# Patient Record
Sex: FEMALE | Race: WHITE | URBAN - METROPOLITAN AREA
[De-identification: names, ages, dates, MRNs, and addresses within clinical notes are randomized per-mention and may not be internally consistent; named-entity substitution may affect disease eponyms.]

---

## 2022-02-04 ENCOUNTER — OFFICE VISIT (OUTPATIENT)
Dept: URBAN - METROPOLITAN AREA CLINIC 23 | Facility: CLINIC | Age: 47
End: 2022-02-04

## 2022-02-24 ENCOUNTER — WEB ENCOUNTER (OUTPATIENT)
Dept: URBAN - METROPOLITAN AREA CLINIC 23 | Facility: CLINIC | Age: 47
End: 2022-02-24

## 2022-02-24 ENCOUNTER — OFFICE VISIT (OUTPATIENT)
Dept: URBAN - METROPOLITAN AREA CLINIC 23 | Facility: CLINIC | Age: 47
End: 2022-02-24
Payer: COMMERCIAL

## 2022-02-24 ENCOUNTER — LAB OUTSIDE AN ENCOUNTER (OUTPATIENT)
Dept: URBAN - METROPOLITAN AREA CLINIC 23 | Facility: CLINIC | Age: 47
End: 2022-02-24

## 2022-02-24 DIAGNOSIS — Z86.010 PERSONAL HISTORY OF COLONIC POLYPS: ICD-10-CM

## 2022-02-24 DIAGNOSIS — K62.5 RECTAL BLEED: ICD-10-CM

## 2022-02-24 DIAGNOSIS — K59.09 CHRONIC CONSTIPATION: ICD-10-CM

## 2022-02-24 DIAGNOSIS — Z80.0 FHX: COLON CANCER: ICD-10-CM

## 2022-02-24 DIAGNOSIS — R14.0 ABDOMINAL BLOATING: ICD-10-CM

## 2022-02-24 PROCEDURE — 99204 OFFICE O/P NEW MOD 45 MIN: CPT | Performed by: INTERNAL MEDICINE

## 2022-02-24 NOTE — HPI-TODAY'S VISIT:
c/o chronic constipation, bloating for about 20 years  takes stool softner and  dulcolax w relief taking for over 20 years fiberous food, fiber suppl, greasy spicy food make bloating worse bm q few days after taking laxative has occasional heart burn only after eating spicy food fhx colon ca bright red rectal bleeding for  6mo prior colonoscopy 10 years ago "maybe had crohn's diz" and polyps denies abd pain or  weight loss no prior anesthesia difficulties etoh socially, no smoking no lung or heart disease no blood thinners

## 2022-02-26 LAB
ENDOMYSIAL ANTIBODY IGA: NEGATIVE
IMMUNOGLOBULIN A, QN, SERUM: 155
T-TRANSGLUTAMINASE (TTG) IGA: <2

## 2022-03-04 ENCOUNTER — TELEPHONE ENCOUNTER (OUTPATIENT)
Dept: URBAN - METROPOLITAN AREA CLINIC 92 | Facility: CLINIC | Age: 47
End: 2022-03-04

## 2022-03-10 ENCOUNTER — OFFICE VISIT (OUTPATIENT)
Dept: URBAN - METROPOLITAN AREA CLINIC 23 | Facility: CLINIC | Age: 47
End: 2022-03-10

## 2022-03-10 PROBLEM — 116289008: Status: ACTIVE | Noted: 2022-02-24

## 2022-03-10 PROBLEM — 429006005 FAMILY HISTORY OF MALIGNANT NEOPLASM OF GASTROINTESTINAL TRACT: Status: ACTIVE | Noted: 2022-02-24

## 2022-03-10 PROBLEM — 12063002: Status: ACTIVE | Noted: 2022-02-24

## 2022-03-10 PROBLEM — 236069009: Status: ACTIVE | Noted: 2022-02-24

## 2022-04-25 ENCOUNTER — OFFICE VISIT (OUTPATIENT)
Dept: URBAN - METROPOLITAN AREA SURGERY CENTER 15 | Facility: SURGERY CENTER | Age: 47
End: 2022-04-25

## 2022-06-24 PROBLEM — 428283002 HISTORY OF POLYP OF COLON (SITUATION): Status: ACTIVE | Noted: 2022-02-24

## 2022-07-11 ENCOUNTER — OFFICE VISIT (OUTPATIENT)
Dept: URBAN - METROPOLITAN AREA SURGERY CENTER 15 | Facility: SURGERY CENTER | Age: 47
End: 2022-07-11

## 2023-02-20 ENCOUNTER — TELEPHONE ENCOUNTER (OUTPATIENT)
Dept: URBAN - METROPOLITAN AREA CLINIC 35 | Facility: CLINIC | Age: 48
End: 2023-02-20

## 2023-03-02 ENCOUNTER — TELEPHONE ENCOUNTER (OUTPATIENT)
Dept: URBAN - METROPOLITAN AREA CLINIC 78 | Facility: CLINIC | Age: 48
End: 2023-03-02

## 2023-09-05 ENCOUNTER — OFFICE VISIT (OUTPATIENT)
Dept: URBAN - METROPOLITAN AREA CLINIC 111 | Facility: CLINIC | Age: 48
End: 2023-09-05

## 2024-04-04 ENCOUNTER — LAB (OUTPATIENT)
Dept: URBAN - METROPOLITAN AREA CLINIC 23 | Facility: CLINIC | Age: 49
End: 2024-04-04

## 2024-04-04 ENCOUNTER — OV EP (OUTPATIENT)
Dept: URBAN - METROPOLITAN AREA CLINIC 23 | Facility: CLINIC | Age: 49
End: 2024-04-04
Payer: COMMERCIAL

## 2024-04-04 VITALS
SYSTOLIC BLOOD PRESSURE: 165 MMHG | BODY MASS INDEX: 20.04 KG/M2 | HEIGHT: 70 IN | TEMPERATURE: 97.9 F | HEART RATE: 54 BPM | DIASTOLIC BLOOD PRESSURE: 93 MMHG | WEIGHT: 140 LBS

## 2024-04-04 DIAGNOSIS — K59.09 CHRONIC CONSTIPATION: ICD-10-CM

## 2024-04-04 DIAGNOSIS — R14.0 ABDOMINAL BLOATING: ICD-10-CM

## 2024-04-04 DIAGNOSIS — R19.5 CHANGE IN STOOL CALIBER: ICD-10-CM

## 2024-04-04 DIAGNOSIS — Z80.0 FHX: COLON CANCER: ICD-10-CM

## 2024-04-04 DIAGNOSIS — R63.4 WEIGHT LOSS: ICD-10-CM

## 2024-04-04 DIAGNOSIS — Z86.010 PERSONAL HISTORY OF COLONIC POLYPS: ICD-10-CM

## 2024-04-04 PROBLEM — 89362005: Status: ACTIVE | Noted: 2024-04-04

## 2024-04-04 PROCEDURE — 99214 OFFICE O/P EST MOD 30 MIN: CPT | Performed by: INTERNAL MEDICINE

## 2024-04-04 NOTE — HPI-TODAY'S VISIT:
The patient presents with a chief complaint of persistent abdominal gurgling, bloating, and constipation that has worsened over the past one to two months. The patient reports that the gurgling and bloating occur with food intake and are particularly exacerbated by carbohydrates, bread, and pasta. The patient has been managing these symptoms by limiting their diet, resulting in a weight loss of 10-12 pounds over the past three to four months. The patient has a history of constipation and has been using over-the-counter laxatives, stool softeners, and fiber supplements to aid in bowel movements. Despite these interventions, the patient still experiences difficulty with bowel movements and requires the use of laxatives to have a bowel movement. The patient reports having thin stools on and off for an extended period. The patient has a family history of colon cancer, with their mother being diagnosed at the age of 49 and passing away at 54. The patient underwent a colonoscopy approximately eight years ago. The patient denies any blood in the stool, pain, or other symptoms. The patient has no history of heart disease, no issues with anesthesia, and is not taking any blood thinners.

## 2024-04-09 ENCOUNTER — COLON (OUTPATIENT)
Dept: URBAN - METROPOLITAN AREA SURGERY CENTER 15 | Facility: SURGERY CENTER | Age: 49
End: 2024-04-09